# Patient Record
Sex: FEMALE | Race: BLACK OR AFRICAN AMERICAN | NOT HISPANIC OR LATINO | URBAN - NONMETROPOLITAN AREA
[De-identification: names, ages, dates, MRNs, and addresses within clinical notes are randomized per-mention and may not be internally consistent; named-entity substitution may affect disease eponyms.]

---

## 2019-03-25 NOTE — PATIENT DISCUSSION
CHOROIDAL NEVUS OS. PRESCRIBED UV PROTECTION TO MINIMIZE UV EXPOSURE. FOLLOW WITH PERIODIC PHOTOGRAPHS TO WATCH FOR GROWTH OR ANY CHANGES. RETURN FOR FOLLOW-UP AS SCHEDULED.

## 2022-12-02 ENCOUNTER — NEW PATIENT (OUTPATIENT)
Dept: URBAN - NONMETROPOLITAN AREA CLINIC 6 | Facility: CLINIC | Age: 72
End: 2022-12-02

## 2022-12-02 PROCEDURE — 92004 COMPRE OPH EXAM NEW PT 1/>: CPT

## 2022-12-02 PROCEDURE — 92015 DETERMINE REFRACTIVE STATE: CPT

## 2022-12-02 PROCEDURE — 92133 CPTRZD OPH DX IMG PST SGM ON: CPT

## 2022-12-02 ASSESSMENT — VISUAL ACUITY
OU_CC: 20/25
OD_SC: 20/40
OS_SC: 20/30-1
OD_CC: 20/30-1
OU_SC: 20/30-1
OS_CC: 20/25-2

## 2022-12-02 ASSESSMENT — KERATOMETRY
OS_AXISANGLE_DEGREES: 125
OD_K1POWER_DIOPTERS: 44.25
OS_AXISANGLE2_DEGREES: 35
OD_K2POWER_DIOPTERS: 45.50
OD_AXISANGLE2_DEGREES: 165
OD_AXISANGLE_DEGREES: 75
OS_K2POWER_DIOPTERS: 45.50
OS_K1POWER_DIOPTERS: 44.75

## 2022-12-02 ASSESSMENT — TONOMETRY
OD_IOP_MMHG: 20
OS_IOP_MMHG: 20

## 2022-12-22 ENCOUNTER — COMPREHENSIVE EXAM (OUTPATIENT)
Dept: URBAN - NONMETROPOLITAN AREA CLINIC 6 | Facility: CLINIC | Age: 72
End: 2022-12-22

## 2022-12-22 PROCEDURE — 92014 COMPRE OPH EXAM EST PT 1/>: CPT

## 2022-12-22 ASSESSMENT — TONOMETRY
OD_IOP_MMHG: 18
OS_IOP_MMHG: 17

## 2022-12-22 ASSESSMENT — VISUAL ACUITY
OS_CC: 20/30
OD_CC: 20/30-1
OS_SC: 20/30-1
OD_SC: 20/40-2
OS_GLARE: 20/400

## 2023-03-08 PROBLEM — Z96.1: Noted: 2023-03-22

## 2023-03-08 PROBLEM — Z96.1: Noted: 2023-03-08

## 2023-04-03 ENCOUNTER — POST-OP (OUTPATIENT)
Dept: URBAN - NONMETROPOLITAN AREA CLINIC 6 | Facility: CLINIC | Age: 73
End: 2023-04-03

## 2023-04-03 DIAGNOSIS — Z96.1: ICD-10-CM

## 2023-04-03 PROCEDURE — 99024 POSTOP FOLLOW-UP VISIT: CPT

## 2023-04-03 ASSESSMENT — TONOMETRY
OD_IOP_MMHG: 17
OS_IOP_MMHG: 16

## 2023-04-03 ASSESSMENT — KERATOMETRY
OD_AXISANGLE_DEGREES: 78
OD_AXISANGLE2_DEGREES: 168
OS_AXISANGLE_DEGREES: 127
OS_K2POWER_DIOPTERS: 45.50
OS_K1POWER_DIOPTERS: 45.00
OD_K1POWER_DIOPTERS: 44.00
OD_K2POWER_DIOPTERS: 45.50
OS_AXISANGLE2_DEGREES: 37

## 2023-04-03 ASSESSMENT — VISUAL ACUITY
OD_SC: 20/50-2
OU_SC: 20/30
OS_SC: 20/30-2

## 2024-03-31 NOTE — PATIENT DISCUSSION
"""Dr. Zia Kimball to review test results with patient. ""
"""Follow ERM w/o surgery. Call if vision decreases or distortion increases.  """
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 Call if vision or distortion increases.  """
"""PCO(s) are not visually significant for capsulotomy.  Monitor by regular ""
Opt out